# Patient Record
Sex: MALE | Race: BLACK OR AFRICAN AMERICAN | NOT HISPANIC OR LATINO | URBAN - METROPOLITAN AREA
[De-identification: names, ages, dates, MRNs, and addresses within clinical notes are randomized per-mention and may not be internally consistent; named-entity substitution may affect disease eponyms.]

---

## 2019-05-08 ENCOUNTER — INPATIENT (INPATIENT)
Facility: HOSPITAL | Age: 54
LOS: 2 days | Discharge: HOME | DRG: 69 | End: 2019-05-11
Attending: INTERNAL MEDICINE | Admitting: INTERNAL MEDICINE
Payer: COMMERCIAL

## 2019-05-08 VITALS
HEART RATE: 84 BPM | DIASTOLIC BLOOD PRESSURE: 82 MMHG | SYSTOLIC BLOOD PRESSURE: 131 MMHG | TEMPERATURE: 99 F | OXYGEN SATURATION: 99 % | RESPIRATION RATE: 17 BRPM

## 2019-05-08 DIAGNOSIS — G45.3 AMAUROSIS FUGAX: ICD-10-CM

## 2019-05-08 LAB
APTT BLD: 33.7 SEC — SIGNIFICANT CHANGE UP (ref 27.5–36.3)
BASOPHILS # BLD AUTO: 0.1 K/UL — SIGNIFICANT CHANGE UP (ref 0–0.2)
BASOPHILS NFR BLD AUTO: 1.1 % — SIGNIFICANT CHANGE UP (ref 0–2)
EOSINOPHIL # BLD AUTO: 0.5 K/UL — SIGNIFICANT CHANGE UP (ref 0–0.5)
EOSINOPHIL NFR BLD AUTO: 8.6 % — HIGH (ref 0–6)
ERYTHROCYTE [SEDIMENTATION RATE] IN BLOOD: 2 MM/HR — SIGNIFICANT CHANGE UP (ref 0–20)
HCT VFR BLD CALC: 41.1 % — SIGNIFICANT CHANGE UP (ref 39–50)
HGB BLD-MCNC: 14.4 G/DL — SIGNIFICANT CHANGE UP (ref 13–17)
INR BLD: 1.63 RATIO — HIGH (ref 0.88–1.16)
LYMPHOCYTES # BLD AUTO: 1.9 K/UL — SIGNIFICANT CHANGE UP (ref 1–3.3)
LYMPHOCYTES # BLD AUTO: 34.1 % — SIGNIFICANT CHANGE UP (ref 13–44)
MCHC RBC-ENTMCNC: 31.4 PG — SIGNIFICANT CHANGE UP (ref 27–34)
MCHC RBC-ENTMCNC: 34.9 GM/DL — SIGNIFICANT CHANGE UP (ref 32–36)
MCV RBC AUTO: 89.9 FL — SIGNIFICANT CHANGE UP (ref 80–100)
MONOCYTES # BLD AUTO: 0.5 K/UL — SIGNIFICANT CHANGE UP (ref 0–0.9)
MONOCYTES NFR BLD AUTO: 8.2 % — SIGNIFICANT CHANGE UP (ref 2–14)
NEUTROPHILS # BLD AUTO: 2.7 K/UL — SIGNIFICANT CHANGE UP (ref 1.8–7.4)
NEUTROPHILS NFR BLD AUTO: 48 % — SIGNIFICANT CHANGE UP (ref 43–77)
PLATELET # BLD AUTO: 202 K/UL — SIGNIFICANT CHANGE UP (ref 150–400)
PROTHROM AB SERPL-ACNC: 19 SEC — HIGH (ref 10–12.9)
RBC # BLD: 4.57 M/UL — SIGNIFICANT CHANGE UP (ref 4.2–5.8)
RBC # FLD: 11.6 % — SIGNIFICANT CHANGE UP (ref 10.3–14.5)
WBC # BLD: 5.6 K/UL — SIGNIFICANT CHANGE UP (ref 3.8–10.5)
WBC # FLD AUTO: 5.6 K/UL — SIGNIFICANT CHANGE UP (ref 3.8–10.5)

## 2019-05-08 PROCEDURE — 99285 EMERGENCY DEPT VISIT HI MDM: CPT | Mod: 25

## 2019-05-08 PROCEDURE — 70450 CT HEAD/BRAIN W/O DYE: CPT | Mod: 26

## 2019-05-08 PROCEDURE — 71250 CT THORAX DX C-: CPT | Mod: 26

## 2019-05-08 PROCEDURE — 93010 ELECTROCARDIOGRAM REPORT: CPT | Mod: NC

## 2019-05-08 RX ORDER — METOPROLOL TARTRATE 50 MG
25 TABLET ORAL DAILY
Qty: 0 | Refills: 0 | Status: DISCONTINUED | OUTPATIENT
Start: 2019-05-08 | End: 2019-05-11

## 2019-05-08 RX ORDER — WARFARIN SODIUM 2.5 MG/1
8 TABLET ORAL ONCE
Qty: 0 | Refills: 0 | Status: COMPLETED | OUTPATIENT
Start: 2019-05-08 | End: 2019-05-08

## 2019-05-08 RX ORDER — HEPARIN SODIUM 5000 [USP'U]/ML
INJECTION INTRAVENOUS; SUBCUTANEOUS
Qty: 25000 | Refills: 0 | Status: DISCONTINUED | OUTPATIENT
Start: 2019-05-08 | End: 2019-05-11

## 2019-05-08 RX ORDER — ASPIRIN/CALCIUM CARB/MAGNESIUM 324 MG
81 TABLET ORAL DAILY
Qty: 0 | Refills: 0 | Status: DISCONTINUED | OUTPATIENT
Start: 2019-05-08 | End: 2019-05-11

## 2019-05-08 RX ADMIN — HEPARIN SODIUM 1200 UNIT(S)/HR: 5000 INJECTION INTRAVENOUS; SUBCUTANEOUS at 22:14

## 2019-05-08 RX ADMIN — WARFARIN SODIUM 8 MILLIGRAM(S): 2.5 TABLET ORAL at 22:24

## 2019-05-08 NOTE — H&P ADULT - ASSESSMENT
pt with hx of mechanical MVR sec TO mR, PAF non compliant to AC with visual complain, ha, and palpitation.  admit to tele  optho  ct scan  of head r/o cva  will consider MRI  ct chest sec to mass  beta blocker  coumadin  asa daily

## 2019-05-08 NOTE — CONSULT NOTE ADULT - SUBJECTIVE AND OBJECTIVE BOX
[facility] Ophthalmology Consult Note    HPI:  54 yo PMHx of mitral valve replacement on Coumadin, non-smoker, presents to the ED referred by his cardiologist c/o 3 episodes of transient vision loss in right eye over last few months. He describes it as "opaque blurring of lower half of his vision" that lasts under a minute and resolves with an episode occurring today. Denies HA, scalp tenderness, new joint pain, jaw pain, pain with chewing, hip/shoulder pain, flashers, floaters. Vision currently at baseline.      PMH: As above  Meds: Coumadin, see sunrise  POcHx (including surgeries/lasers/trauma):  PTG surgery OU unsure when  Drops: None  FamHx: None  Social Hx: Nonsmoker  Allergies: NKDA    ROS:  General (neg), Vision (per HPI), Head and Neck (neg), Pulm (neg), CV (neg), GI (neg),  (neg), Musculoskeletal (neg), Skin/Integ (neg), Neuro (neg), Endocrine (neg), Heme (neg), All/Immuno (neg)    Mood and Affect Appropriate ( x ),  Oriented to Time, Place, and Person x 3 ( x )    Ophthalmology Exam    Visual acuity (cc): 20/40 PH 20/20 OD, 20/30 PH 20/20 OS  Pupils: PERRL OU, no APD  Ttono: 13 OU  Extraocular movements (EOMs): Full OU, no pain, no diplopia   Confrontational Visual Field (CVF):  Full OU  Color Plates: 12/12 OU    Pen Light Exam (PLE)  External:  Flat OU  Lids/Lashes/Lacrimal Ducts: Flat OU    Sclera/Conjunctiva:  W+Q OU  Cornea: Cl OU  Anterior Chamber: D+F OU  Iris:  Flat OU  Lens:  Cl OU    Fundus Exam: dilated with 1% tropicamide and 2.5% phenylephrine  Approval obtained from primary team for dilation  Patient aware that pupils can remained dilated for at least 4-6 hours  Exam performed with 20D lens    Vitreous: wnl OU  Disc, cup/disc: sharp and pink, 0.4 OU  Macula:  wnl OU  Vessels:  wnl OU  Periphery: CRS scarring temp and inf OD, wnl OS    Assessment/Plan:   54 yo PMHx of mitral valve replacement on Coumadin, non-smoker, presents to the ED referred by his cardiologist c/o 3 episodes of transient vision loss in right eye over last few months. He describes it as "opaque blurring of lower half of his vision" that lasts under a minute and resolves with an episode occurring today. Denies HA, scalp tenderness, new joint pain, jaw pain, pain with chewing, hip/shoulder pain, flashers, floaters. Vision currently at baseline.  - low suspicion for GCA based on symptoms, ED sent ESR, CRP. Ply wnl   - dilated fundus exam within normal limits in both eyes  - recommend Neurology consultation for TIA work-up/imaging, with Cardiovascular work-up, Carotid ultrasound  - Patient should follow up his/her ophthalmologist/neuro-opth or in the [facility] within 1 week of discharge.  [address] [facility].  [place], [place] [id number] [phone]

## 2019-05-08 NOTE — ED ADULT NURSE NOTE - OBJECTIVE STATEMENT
52 yo presents to the ED from home. A&Ox4, ambulatory c/o intermittent R eye vision loss x 1 week. PMH of mitral valve replacement, on AC. no HA. no pain anywhere. gross neuro intact. vision intact currently. VSS. pt well appearing. 20G LAC.

## 2019-05-08 NOTE — ED PROVIDER NOTE - CRANIAL NERVE AND PUPILLARY EXAM
cranial nerves 2-12 intact/central and peripheral vision intact/extra-ocular movements intact/cough reflex intact/corneal reflex intact

## 2019-05-08 NOTE — ED PROVIDER NOTE - CLINICAL SUMMARY MEDICAL DECISION MAKING FREE TEXT BOX
Pt with a mechanical mitral valve has 2 episodes of altered vision transiently back to base line now non focal exam on coumadin concern for emboli to right eye CT head eval for temporal arteritis labs eye eval admit --Lacey

## 2019-05-08 NOTE — H&P ADULT - HISTORY OF PRESENT ILLNESS
CHIEF COMPLAINT:Patient is a 53y old  Male who presents with a chief complaint of r sided visual loss/palpitation    HPI:  pt is a 52 yo male with hx of mitral regurgitation s/p mechanical valve, non compliant to follow up who was sent to er with decrease r eye vision last few days, hd and palpitation.  pt with known  hx of paf .  no chest pain.    PAST MEDICAL & SURGICAL HISTORY:  Mitral Valve Disease  Abnormality, Eye: ptygerium removed  s/p Mitral Valve Repair: 8/21/09      MEDICATIONS  (STANDING):    MEDICATIONS  (PRN):  coumadin    FAMILY HISTORY:      SOCIAL HISTORY:    [ ] Non-smoker  [ ] Smoker  [ ] Alcohol    Allergies    No Known Allergies    Intolerances    	    REVIEW OF SYSTEMS:  CONSTITUTIONAL: No fever, weight loss, or fatigue  EYES: No eye pain,+ r eye  visual disturbances, or discharge  ENT:  No difficulty hearing, tinnitus, vertigo; No sinus or throat pain  NECK: No pain or stiffness  RESPIRATORY: No cough, wheezing, chills or hemoptysis; + Shortness of Breath  CARDIOVASCULAR: No chest pain, palpitations, passing out, dizziness, or leg swelling  GASTROINTESTINAL: No abdominal or epigastric pain. No nausea, vomiting, or hematemesis; No diarrhea or constipation. No melena or hematochezia.  GENITOURINARY: No dysuria, frequency, hematuria, or incontinence  NEUROLOGICAL: No headaches, memory loss, loss of strength, numbness, or tremors  SKIN: No itching, burning, rashes, or lesions   LYMPH Nodes: No enlarged glands  ENDOCRINE: No heat or cold intolerance; No hair loss  MUSCULOSKELETAL: No joint pain or swelling; No muscle, back, or extremity pain  PSYCHIATRIC: No depression, anxiety, mood swings, or difficulty sleeping  HEME/LYMPH: No easy bruising, or bleeding gums  ALLERGY AND IMMUNOLOGIC: No hives or eczema	    [ ] All others negative	  [ ] Unable to obtain    PHYSICAL EXAM:  T(C): 37 (05-08-19 @ 16:40), Max: 37 (05-08-19 @ 16:40)  HR: 84 (05-08-19 @ 16:40) (84 - 84)  BP: 131/82 (05-08-19 @ 16:40) (131/82 - 131/82)  RR: 17 (05-08-19 @ 16:40) (17 - 17)  SpO2: 99% (05-08-19 @ 16:40) (99% - 99%)  Wt(kg): --  I&O's Summary      Appearance: Normal	  HEENT:   Normal oral mucosa, PERRL, EOMI	  Lymphatic: No lymphadenopathy  Cardiovascular: Normal S1 S2, No JVD, + murmurs, No edema  Respiratory: Lungs clear to auscultation	  Psychiatry: A & O x 3, Mood & affect appropriate  Gastrointestinal:  Soft, Non-tender, + BS	  Skin: No rashes, No ecchymoses, No cyanosis	  Neurologic: Non-focal  Extremities: Normal range of motion, No clubbing, cyanosis or edema  Vascular: Peripheral pulses palpable 2+ bilaterally  + small mass under the r breast, non tender.    TELEMETRY: 	    ECG:  	  RADIOLOGY:  OTHER: 	  	  LABS:	 	    CARDIAC MARKERS:                              14.4   5.6   )-----------( 202      ( 08 May 2019 18:03 )             41.1           proBNP:   Lipid Profile:   HgA1c:   TSH:   PT/INR - ( 08 May 2019 18:03 )   PT: 19.0 sec;   INR: 1.63 ratio         PTT - ( 08 May 2019 18:03 )  PTT:33.7 sec    PREVIOUS DIAGNOSTIC TESTING:    < from: Transthoracic Echocardiogram w/ Doppler (05.13.10 @ 08:49) >  1. Mechanical mitral valve prosthesis.  2. Severe left atrial enlargement.  3. Normal left ventricular internal dimensions and wall  thickness.  4. Normal left ventricular systolic function. EF 60%. No  Segmental wall motion abnormalities  5. Normal right atrium.  6. Normal right ventricular size and systolic function.  7. Normal pericardium with no pericardial effusion.  8. Mild mitral regurgitation. Mean transmitral valve  gradient equals 5mm Hg, which is probably normal in the  setting of a prosthetic valve.  9. Mild tricuspid regurgitation. Estimated pulmonary artery  systolic pressure equals 31 mm Hg, assuming right atrial  pressure equals 10  mm Hg.    < end of copied text >

## 2019-05-08 NOTE — ED ADULT NURSE REASSESSMENT NOTE - NS ED NURSE REASSESS COMMENT FT1
Pt resting comfortably in bed. Heparin drip infusing. Pt denying discomfort or vision changes at this time. Provided call bell and extra blankets. Waiting for bed upstairs. Pt resting comfortably in bed. Heparin drip infusing. Per MD Lacey, no heparin bolus to be given due to warfarin use. Pt denying discomfort or vision changes at this time. Provided call bell and extra blankets. Waiting for bed upstairs.

## 2019-05-09 ENCOUNTER — TRANSCRIPTION ENCOUNTER (OUTPATIENT)
Age: 54
End: 2019-05-09

## 2019-05-09 LAB
ALBUMIN SERPL ELPH-MCNC: 3.9 G/DL — SIGNIFICANT CHANGE UP (ref 3.3–5)
ALP SERPL-CCNC: 47 U/L — SIGNIFICANT CHANGE UP (ref 40–120)
ALT FLD-CCNC: 24 U/L — SIGNIFICANT CHANGE UP (ref 10–45)
ANION GAP SERPL CALC-SCNC: 11 MMOL/L — SIGNIFICANT CHANGE UP (ref 5–17)
APTT BLD: 125.3 SEC — CRITICAL HIGH (ref 27.5–36.3)
APTT BLD: 48.2 SEC — HIGH (ref 27.5–36.3)
APTT BLD: 74.7 SEC — HIGH (ref 27.5–36.3)
AST SERPL-CCNC: 22 U/L — SIGNIFICANT CHANGE UP (ref 10–40)
BILIRUB SERPL-MCNC: 1.6 MG/DL — HIGH (ref 0.2–1.2)
BUN SERPL-MCNC: 8 MG/DL — SIGNIFICANT CHANGE UP (ref 7–23)
CALCIUM SERPL-MCNC: 9 MG/DL — SIGNIFICANT CHANGE UP (ref 8.4–10.5)
CHLORIDE SERPL-SCNC: 104 MMOL/L — SIGNIFICANT CHANGE UP (ref 96–108)
CO2 SERPL-SCNC: 27 MMOL/L — SIGNIFICANT CHANGE UP (ref 22–31)
CREAT SERPL-MCNC: 1.01 MG/DL — SIGNIFICANT CHANGE UP (ref 0.5–1.3)
CRP SERPL-MCNC: <0.1 MG/DL — SIGNIFICANT CHANGE UP (ref 0–0.4)
GLUCOSE SERPL-MCNC: 103 MG/DL — HIGH (ref 70–99)
HCT VFR BLD CALC: 41.3 % — SIGNIFICANT CHANGE UP (ref 39–50)
HCT VFR BLD CALC: 42.9 % — SIGNIFICANT CHANGE UP (ref 39–50)
HCV AB S/CO SERPL IA: 0.19 S/CO — SIGNIFICANT CHANGE UP (ref 0–0.99)
HCV AB SERPL-IMP: SIGNIFICANT CHANGE UP
HGB BLD-MCNC: 14.8 G/DL — SIGNIFICANT CHANGE UP (ref 13–17)
HGB BLD-MCNC: 14.9 G/DL — SIGNIFICANT CHANGE UP (ref 13–17)
INR BLD: 1.99 RATIO — HIGH (ref 0.88–1.16)
MCHC RBC-ENTMCNC: 31 PG — SIGNIFICANT CHANGE UP (ref 27–34)
MCHC RBC-ENTMCNC: 32 PG — SIGNIFICANT CHANGE UP (ref 27–34)
MCHC RBC-ENTMCNC: 34.6 GM/DL — SIGNIFICANT CHANGE UP (ref 32–36)
MCHC RBC-ENTMCNC: 35.7 GM/DL — SIGNIFICANT CHANGE UP (ref 32–36)
MCV RBC AUTO: 89.6 FL — SIGNIFICANT CHANGE UP (ref 80–100)
MCV RBC AUTO: 89.6 FL — SIGNIFICANT CHANGE UP (ref 80–100)
PLATELET # BLD AUTO: 183 K/UL — SIGNIFICANT CHANGE UP (ref 150–400)
PLATELET # BLD AUTO: 187 K/UL — SIGNIFICANT CHANGE UP (ref 150–400)
POTASSIUM SERPL-MCNC: 3.5 MMOL/L — SIGNIFICANT CHANGE UP (ref 3.5–5.3)
POTASSIUM SERPL-SCNC: 3.5 MMOL/L — SIGNIFICANT CHANGE UP (ref 3.5–5.3)
PROT SERPL-MCNC: 6.4 G/DL — SIGNIFICANT CHANGE UP (ref 6–8.3)
PROTHROM AB SERPL-ACNC: 23.4 SEC — HIGH (ref 10–12.9)
RBC # BLD: 4.61 M/UL — SIGNIFICANT CHANGE UP (ref 4.2–5.8)
RBC # BLD: 4.79 M/UL — SIGNIFICANT CHANGE UP (ref 4.2–5.8)
RBC # FLD: 11.4 % — SIGNIFICANT CHANGE UP (ref 10.3–14.5)
RBC # FLD: 11.5 % — SIGNIFICANT CHANGE UP (ref 10.3–14.5)
SODIUM SERPL-SCNC: 142 MMOL/L — SIGNIFICANT CHANGE UP (ref 135–145)
TSH SERPL-MCNC: 1.89 UIU/ML — SIGNIFICANT CHANGE UP (ref 0.27–4.2)
WBC # BLD: 5.1 K/UL — SIGNIFICANT CHANGE UP (ref 3.8–10.5)
WBC # BLD: 5.2 K/UL — SIGNIFICANT CHANGE UP (ref 3.8–10.5)
WBC # FLD AUTO: 5.1 K/UL — SIGNIFICANT CHANGE UP (ref 3.8–10.5)
WBC # FLD AUTO: 5.2 K/UL — SIGNIFICANT CHANGE UP (ref 3.8–10.5)

## 2019-05-09 PROCEDURE — 93306 TTE W/DOPPLER COMPLETE: CPT | Mod: 26

## 2019-05-09 PROCEDURE — 70551 MRI BRAIN STEM W/O DYE: CPT | Mod: 26

## 2019-05-09 PROCEDURE — 71045 X-RAY EXAM CHEST 1 VIEW: CPT | Mod: 26

## 2019-05-09 PROCEDURE — 93880 EXTRACRANIAL BILAT STUDY: CPT | Mod: 26

## 2019-05-09 PROCEDURE — 99223 1ST HOSP IP/OBS HIGH 75: CPT

## 2019-05-09 RX ORDER — WARFARIN SODIUM 2.5 MG/1
6 TABLET ORAL ONCE
Refills: 0 | Status: COMPLETED | OUTPATIENT
Start: 2019-05-09 | End: 2019-05-09

## 2019-05-09 RX ORDER — WARFARIN SODIUM 2.5 MG/1
2 TABLET ORAL ONCE
Refills: 0 | Status: COMPLETED | OUTPATIENT
Start: 2019-05-09 | End: 2019-05-09

## 2019-05-09 RX ADMIN — HEPARIN SODIUM 600 UNIT(S)/HR: 5000 INJECTION INTRAVENOUS; SUBCUTANEOUS at 16:33

## 2019-05-09 RX ADMIN — WARFARIN SODIUM 6 MILLIGRAM(S): 2.5 TABLET ORAL at 21:44

## 2019-05-09 RX ADMIN — HEPARIN SODIUM 600 UNIT(S)/HR: 5000 INJECTION INTRAVENOUS; SUBCUTANEOUS at 23:37

## 2019-05-09 RX ADMIN — HEPARIN SODIUM 0 UNIT(S)/HR: 5000 INJECTION INTRAVENOUS; SUBCUTANEOUS at 05:01

## 2019-05-09 RX ADMIN — WARFARIN SODIUM 2 MILLIGRAM(S): 2.5 TABLET ORAL at 23:37

## 2019-05-09 RX ADMIN — HEPARIN SODIUM 0 UNIT(S)/HR: 5000 INJECTION INTRAVENOUS; SUBCUTANEOUS at 13:19

## 2019-05-09 RX ADMIN — Medication 81 MILLIGRAM(S): at 13:21

## 2019-05-09 RX ADMIN — HEPARIN SODIUM 900 UNIT(S)/HR: 5000 INJECTION INTRAVENOUS; SUBCUTANEOUS at 06:04

## 2019-05-09 RX ADMIN — Medication 25 MILLIGRAM(S): at 05:30

## 2019-05-09 NOTE — DISCHARGE NOTE PROVIDER - CARE PROVIDERS DIRECT ADDRESSES
,DirectAddress_Unknown ,DirectAddress_Unknown,[email or web address] ,DirectAddress_Unknown,[email or web address],DirectAddress_Unknown

## 2019-05-09 NOTE — PROGRESS NOTE ADULT - SUBJECTIVE AND OBJECTIVE BOX
CARDIOLOGY     PROGRESS  NOTE   ________________________________________________    CHIEF COMPLAINT:Patient is a 53y old  Male who presents with a chief complaint of blurry vision r side/palpitation (08 May 2019 20:22)    	  REVIEW OF SYSTEMS:  CONSTITUTIONAL: No fever, weight loss, or fatigue  EYES: No eye pain, visual disturbances, or discharge  ENT:  No difficulty hearing, tinnitus, vertigo; No sinus or throat pain  NECK: No pain or stiffness  RESPIRATORY: No cough, wheezing, chills or hemoptysis; No Shortness of Breath  CARDIOVASCULAR: No chest pain, palpitations, passing out, dizziness, or leg swelling  GASTROINTESTINAL: No abdominal or epigastric pain. No nausea, vomiting, or hematemesis; No diarrhea or constipation. No melena or hematochezia.  GENITOURINARY: No dysuria, frequency, hematuria, or incontinence  NEUROLOGICAL: No headaches, memory loss, loss of strength, numbness, or tremors  SKIN: No itching, burning, rashes, or lesions   LYMPH Nodes: No enlarged glands  ENDOCRINE: No heat or cold intolerance; No hair loss  MUSCULOSKELETAL: No joint pain or swelling; No muscle, back, or extremity pain  PSYCHIATRIC: No depression, anxiety, mood swings, or difficulty sleeping  HEME/LYMPH: No easy bruising, or bleeding gums  ALLERGY AND IMMUNOLOGIC: No hives or eczema	    [ ] All others negative	  [ ] Unable to obtain    PHYSICAL EXAM:  T(C): 36.4 (05-09-19 @ 05:16), Max: 37 (05-08-19 @ 16:40)  HR: 64 (05-09-19 @ 05:16) (64 - 84)  BP: 131/76 (05-09-19 @ 05:16) (131/76 - 138/86)  RR: 18 (05-09-19 @ 05:16) (17 - 18)  SpO2: 99% (05-09-19 @ 05:16) (99% - 99%)  Wt(kg): --  I&O's Summary      Appearance: Normal	  HEENT:   Normal oral mucosa, PERRL, EOMI	  Lymphatic: No lymphadenopathy  Cardiovascular: Normal S1 S2, No JVD, No murmurs, No edema  Respiratory: Lungs clear to auscultation	  Psychiatry: A & O x 3, Mood & affect appropriate  Gastrointestinal:  Soft, Non-tender, + BS	  Skin: No rashes, No ecchymoses, No cyanosis	  Neurologic: Non-focal  Extremities: Normal range of motion, No clubbing, cyanosis or edema  Vascular: Peripheral pulses palpable 2+ bilaterally    MEDICATIONS  (STANDING):  aspirin enteric coated 81 milliGRAM(s) Oral daily  heparin  Infusion.  Unit(s)/Hr (12 mL/Hr) IV Continuous <Continuous>  metoprolol succinate ER 25 milliGRAM(s) Oral daily      TELEMETRY: 	    ECG:  	  RADIOLOGY:  OTHER: 	  	  LABS:	 	    CARDIAC MARKERS:                                14.9   5.1   )-----------( 187      ( 09 May 2019 04:30 )             42.9     05-09    142  |  104  |  8   ----------------------------<  103<H>  3.5   |  27  |  1.01    Ca    9.0      09 May 2019 04:30    TPro  6.4  /  Alb  3.9  /  TBili  1.6<H>  /  DBili  x   /  AST  22  /  ALT  24  /  AlkPhos  47  05-09    proBNP:   Lipid Profile:   HgA1c:   TSH:   PT/INR - ( 09 May 2019 04:30 )   PT: 23.4 sec;   INR: 1.99 ratio         PTT - ( 09 May 2019 04:30 )  PTT:125.3 sec    < from: CT Head w/o Cont (01.03.13 @ 19:49) >  IMPRESSION:  No acute intracranial hemorrhage, mass effect, or CT   evidence of acute territorial infarct.    < end of copied text >  < from: CT Head w/o Cont (01.03.13 @ 19:49) >  IMPRESSION:  No acute intracranial hemorrhage, mass effect, or CT   evidence of acute territorial infarct.        Assessment and plan  ---------------------------  awaiting mri  continue ac  surgery called for the mass  continue beta blocker  echo

## 2019-05-09 NOTE — CONSULT NOTE ADULT - ASSESSMENT
pt with hx of mechanical MVR  for  mR,    PAF non compliant to AC with visual complain,ts,  and palpitation.  admit to tele  optho eval/  vision good  now  ct scan  of head r/o cva  lipoma,  right chest  wall  beta blocker  coumadin. per inr  asa daily   ct head, normal

## 2019-05-09 NOTE — DISCHARGE NOTE PROVIDER - CARE PROVIDER_API CALL
Wendy Raza (DO)  Cardiology Medicine  [address] [place] [address], [address]  [place] [address]  Phone: [phone]  Fax: [phone]  Follow Up Time: 1-3 days Wendy Raza (DO)  Cardiology Medicine  [address] [place] [address], [address]  [place] [address]  Phone: [phone]  Fax: [phone]  Follow Up Time: 1-3 days    Osmin Osorio)  Surgery  [address] [facility], [place] [address]  Phone: [phone]  Fax: [phone]  Follow Up Time: 2 weeks Wendy Raza (DO)  Cardiology Medicine  [address] [place] [address], [address]  [place] [address]  Phone: [phone]  Fax: [phone]  Follow Up Time: 1-3 days    Osmin Osorio)  Surgery  [address] [facility], [place] [address]  Phone: [phone]  Fax: [phone]  Follow Up Time: 2 weeks    Andre Baron (DO)  Neurology; Vascular Neurology  [address] [facility] [address]  [place] [address]  Phone: [phone]  Fax: [phone]  Follow Up Time: 2 weeks

## 2019-05-09 NOTE — CONSULT NOTE ADULT - ATTENDING COMMENTS
- I have seen and examined the patient on rounds. Patient's chart, labs, images and reports reviewed.  Consult called to evaluate Right chest wall mass  Pt claims to have self palpated the mass measuring 2 cm in R chest wall for a few weeks  no pain  On exam- 2 cm mobile soft mass right lateral outer chest wall , consistent with most likely a lipoma  No need for urgent surgical intervention  Can plan elective excision.  -Patient instructed to follow up with me in my office 7-10 days after discharge. Follow up instructions provided.  - I have discussed the diagnosis with the patient in detail. Patient expressed verbal understanding and willingness to proceed with proposed plan. All questions answered    Regards  Osmin Mac MD  Division of Surgical Oncology  [address] [place] [id number]  Ph:[phone]

## 2019-05-09 NOTE — CONSULT NOTE ADULT - SUBJECTIVE AND OBJECTIVE BOX
NUTRITION SUPPORT / TPN CONSULT NOTE      CHIEF COMPLAINT:Patient is a 53y old  Male who presents with a chief complaint of r sided visual loss/palpitation    HPI:  Pt is a 54 yo male with hx of mitral regurgitation s/p mechanical valve, non compliant to follow up who was sent to er with decrease r eye vision last few days, hd and palpitation. Pt with known  hx of PAF . No chest pain. Right breast mass noted on physical exam    PAST MEDICAL & SURGICAL HISTORY:  Mitral Valve Disease  Abnormality, Eye: ptygerium removed  s/p Mitral Valve Repair: 8/21/09      MEDICATIONS  (STANDING):    MEDICATIONS  coumadin    FAMILY HISTORY:      SOCIAL HISTORY:    [ ] Non-smoker  [ ] Smoker  [ ] Alcohol    Allergies    No Known Allergies    Intolerances    	    REVIEW OF SYSTEMS:  CONSTITUTIONAL: No fever, weight loss, or fatigue  EYES: as per HPI  ENT:  No difficulty hearing, tinnitus, vertigo; No sinus or throat pain  NECK: No pain or stiffness  RESPIRATORY: No cough, wheezing, chills or hemoptysis; + Shortness of Breath  CARDIOVASCULAR: No chest pain, palpitations, passing out, dizziness, or leg swelling  GASTROINTESTINAL: No abdominal or epigastric pain. No nausea, vomiting, or hematemesis; No diarrhea or constipation. No melena or hematochezia.  GENITOURINARY: No dysuria, frequency, hematuria, or incontinence  NEUROLOGICAL: No headaches, memory loss, loss of strength, numbness, or tremors  SKIN: No itching, burning, rashes, or lesions   LYMPH Nodes: No enlarged glands  ENDOCRINE: No heat or cold intolerance; No hair loss  MUSCULOSKELETAL: No joint pain or swelling; No muscle, back, or extremity pain  PSYCHIATRIC: No depression, anxiety, mood swings, or difficulty sleeping  HEME/LYMPH: No easy bruising, or bleeding gums  ALLERGY AND IMMUNOLOGIC: No hives or eczema	    [ ] All others negative	  [ ] Unable to obtain    PHYSICAL EXAM:  T(C): 37 (05-08-19 @ 16:40), Max: 37 (05-08-19 @ 16:40)  HR: 84 (05-08-19 @ 16:40) (84 - 84)  BP: 131/82 (05-08-19 @ 16:40) (131/82 - 131/82)  RR: 17 (05-08-19 @ 16:40) (17 - 17)  SpO2: 99% (05-08-19 @ 16:40) (99% - 99%)  Wt(kg): --  I&O's Summary      Appearance: Normal	  HEENT:   Normal oral mucosa, PERRL, EOMI	  Lymphatic: No lymphadenopathy  Cardiovascular: Normal S1 S2, No JVD, + murmurs, No edema  Respiratory: Lungs clear to auscultation	  Psychiatry: A & O x 3, Mood & affect appropriate  Gastrointestinal:  Soft, Non-tender, + BS	  Skin: No rashes, No ecchymoses, No cyanosis	  Neurologic: Non-focal  Extremities: Normal range of motion, No clubbing, cyanosis or edema  Vascular: Peripheral pulses palpable 2+ bilaterally  + small mass under the r breast, non tender.    TELEMETRY: 	    ECG:  	  RADIOLOGY:  OTHER: 	  	  LABS:	 	    CARDIAC MARKERS:                              14.4   5.6   )-----------( 202      ( 08 May 2019 18:03 )             41.1           PT/INR - ( 08 May 2019 18:03 )   PT: 19.0 sec;   INR: 1.63 ratio         PTT - ( 08 May 2019 18:03 )  PTT:33.7 sec    PREVIOUS DIAGNOSTIC TESTING:    < from: Transthoracic Echocardiogram w/ Doppler (05.13.10 @ 08:49) >  1. Mechanical mitral valve prosthesis.  2. Severe left atrial enlargement.  3. Normal left ventricular internal dimensions and wall  thickness.  4. Normal left ventricular systolic function. EF 60%. No  Segmental wall motion abnormalities  5. Normal right atrium.  6. Normal right ventricular size and systolic function.  7. Normal pericardium with no pericardial effusion.  8. Mild mitral regurgitation. Mean transmitral valve  gradient equals 5mm Hg, which is probably normal in the  setting of a prosthetic valve.  9. Mild tricuspid regurgitation. Estimated pulmonary artery  systolic pressure equals 31 mm Hg, assuming right atrial  pressure equals 10  mm Hg.    < end of copied text > (08 May 2019 20:22)            MEDICATIONS  (STANDING):  aspirin enteric coated 81 milliGRAM(s) Oral daily  heparin  Infusion.  Unit(s)/Hr (12 mL/Hr) IV Continuous <Continuous>  metoprolol succinate ER 25 milliGRAM(s) Oral daily    MEDICATIONS  (PRN):      PAST MEDICAL & SURGICAL HISTORY:  Mitral Valve Disease  Abnormality, Eye: ptygerium removed  s/p Mitral Valve Repair: 8/21/09      FAMILY HISTORY:      ALLERGIES  No Known Allergies      REVIEW OF SYSTEMS  --------------------------------------------------------------------------------    Skin: No rashes  Head/Eyes/Ears/Mouth: No headache; Normal hearing; Normal vision w/o blurriness; No sinus pain/discomfort, sore throat  Respiratory: No dyspnea, cough, wheezing, hemoptysis  CV: No chest pain, PND, orthopnea  GI: No abdominal pain, diarrhea, constipation, nausea, vomiting, melena, hematochezia  : No increased frequency, dysuria, hematuria, nocturia  MSK: No joint pain/swelling; no back pain; no edema  Neuro: No dizziness/lightheadedness, weakness, seizures, numbness, tingling  Heme: No easy bruising or bleeding  Endo: No heat/cold intolerance  Psych: No significant nervousness, anxiety, stress, depression      VITALS  T(C): 36.4 (05-09-19 @ 05:16), Max: 37 (05-08-19 @ 16:40)  HR: 64 (05-09-19 @ 05:16) (64 - 84)  BP: 131/76 (05-09-19 @ 05:16) (131/76 - 138/86)  RR: 18 (05-09-19 @ 05:16) (17 - 18)  SpO2: 99% (05-09-19 @ 05:16) (99% - 99%)  I&O's Detail        LABS:                        14.9   5.1   )-----------( 187      ( 09 May 2019 04:30 )             42.9     05-09    142  |  104  |  8   ----------------------------<  103<H>  3.5   |  27  |  1.01    Ca    9.0      09 May 2019 04:30    TPro  6.4  /  Alb  3.9  /  TBili  1.6<H>  /  DBili  x   /  AST  22  /  ALT  24  /  AlkPhos  47  05-09    Ionized Calcium Levels:     CAPILLARY BLOOD GLUCOSE      CAPILLARY BLOOD GLUCOSE        PT/INR - ( 09 May 2019 04:30 )   PT: 23.4 sec;   INR: 1.99 ratio         PTT - ( 09 May 2019 04:30 )  PTT:125.3 sec        PHYSICAL EXAM  --------------------------------------------------------------------------------    Physical Exam:  	Gen: NAD, well-appearing  	HEENT: PERRL, supple neck, clear oropharynx  	Pulm: CTA B/L  	CV: RRR, S1S2; no rub  	Back: No spinal or CVA tenderness; no sacral edema  	Abd: +BS, soft, nontender/nondistended          UE: Warm, FROM, no clubbing, intact strength; no edema; no asterixis  	LE: Warm, FROM, no clubbing, intact strength; no edema  	Neuro: No focal deficits, intact gait  	Psych: Normal affect and mood  	Skin: Warm, without rashes NUTRITION SUPPORT / TPN CONSULT NOTE      CHIEF COMPLAINT:Patient is a 53y old  Male who presents with a chief complaint of r sided visual loss/palpitation    HPI:  Pt is a 54 yo male with hx of mitral regurgitation s/p mechanical valve, non compliant to follow up who was sent to er with decrease r eye vision last few days, hd and palpitation. Pt with known  hx of PAF .    Surgery consult called for Right breast mass noted on physical exam.  Pt reports mass is non tender and noticed last fall, and has not grown in size. Aneesh any other palpable masses.  Denies F/C/NS. Denies any assocaited pain.  He reports his visual changes has since resolved.  CT of head was negative.  MRI scheduled for later today.    PAST MEDICAL & SURGICAL HISTORY:  Mitral Valve Disease  Abnormality, Eye: ptygerium removed  s/p Mitral Valve Repair: 8/21/09      MEDICATIONS  coumadin 7mg qD    FAMILY HISTORY:  denies any fam h/o DM, Asthma, HTN  reports Heart disease      SOCIAL HISTORY:    Non-smoker, Social Alcohol use    Allergies  No Known Allergies    Intolerances  none    	    REVIEW OF SYSTEMS:  CONSTITUTIONAL: No fever, weight loss, or fatigue  EYES: as per HPI  ENT:  No difficulty hearing, tinnitus, vertigo; No sinus or throat pain  NECK: No pain or stiffness  RESPIRATORY: No cough, wheezing, chills or hemoptysis; denies Shortness of Breath  CARDIOVASCULAR: No chest pain, palpitations, passing out, dizziness, or leg swelling  GASTROINTESTINAL: No abdominal or epigastric pain. No nausea, vomiting, or hematemesis; No diarrhea or constipation. No melena or hematochezia.  GENITOURINARY: No dysuria, frequency, hematuria, or incontinence  NEUROLOGICAL: No headaches, memory loss, loss of strength, numbness, or tremors  SKIN: No itching, burning, rashes, or lesions   LYMPH Nodes: No enlarged glands  ENDOCRINE: No heat or cold intolerance; No hair loss  MUSCULOSKELETAL: No joint pain or swelling; No muscle, back, or extremity pain  PSYCHIATRIC: No depression, anxiety, mood swings, or difficulty sleeping  HEME/LYMPH:  Reports easy bruising, denies  bleeding gums  ALLERGY AND IMMUNOLOGIC: No hives or eczema	      PHYSICAL EXAM:  T(C): 37 (05-08-19 @ 16:40), Max: 37 (05-08-19 @ 16:40)  HR: 84 (05-08-19 @ 16:40) (84 - 84)  BP: 131/82 (05-08-19 @ 16:40) (131/82 - 131/82)  RR: 17 (05-08-19 @ 16:40) (17 - 17)  SpO2: 99% (05-08-19 @ 16:40) (99% - 99%)  Wt(kg): --  I&O's Summary      Appearance: Normal	  HEENT:   Normal oral mucosa, PERRL, EOMI	  Lymphatic: No axillar  lymphadenopathy  Cardiovascular: Normal S1 S2, No JVD, + murmurs, No edema  Respiratory: Lungs clear to auscultation	Silver dollar size moveable fluctuant mass 23cm away from nipple, at 8:00 right outer lower quadrant, non tender, non erythematous, no skin changes   Psychiatry: A & O x 3, Mood & affect appropriate  Gastrointestinal:  Soft, Non-tender, + BS	  Skin: No rashes, No ecchymoses, No cyanosis	  Neurologic: Non-focal  Extremities: Normal range of motion, No clubbing, cyanosis or edema  Vascular: Peripheral pulses palpable 2+ bilaterally  + small mass under the r breast, non tender.    TELEMETRY: 	    ECG:  	  RADIOLOGY:  OTHER: 	  	  LABS:	 	    CARDIAC MARKERS:                              14.4   5.6   )-----------( 202      ( 08 May 2019 18:03 )             41.1           PT/INR - ( 08 May 2019 18:03 )   PT: 19.0 sec;   INR: 1.63 ratio         PTT - ( 08 May 2019 18:03 )  PTT:33.7 sec    PREVIOUS DIAGNOSTIC TESTING:    < from: Transthoracic Echocardiogram w/ Doppler (05.13.10 @ 08:49) >  1. Mechanical mitral valve prosthesis.  2. Severe left atrial enlargement.  3. Normal left ventricular internal dimensions and wall  thickness.  4. Normal left ventricular systolic function. EF 60%. No  Segmental wall motion abnormalities  5. Normal right atrium.  6. Normal right ventricular size and systolic function.  7. Normal pericardium with no pericardial effusion.  8. Mild mitral regurgitation. Mean transmitral valve  gradient equals 5mm Hg, which is probably normal in the  setting of a prosthetic valve.  9. Mild tricuspid regurgitation. Estimated pulmonary artery  systolic pressure equals 31 mm Hg, assuming right atrial  pressure equals 10  mm Hg.    < end of copied text > (08 May 2019 20:22)            MEDICATIONS  (STANDING):  aspirin enteric coated 81 milliGRAM(s) Oral daily  heparin  Infusion.  Unit(s)/Hr (12 mL/Hr) IV Continuous <Continuous>  metoprolol succinate ER 25 milliGRAM(s) Oral daily    MEDICATIONS  (PRN):      PAST MEDICAL & SURGICAL HISTORY:  Mitral Valve Disease  Abnormality, Eye: ptygerium removed  s/p Mitral Valve Repair: 8/21/09      FAMILY HISTORY:      ALLERGIES  No Known Allergies      REVIEW OF SYSTEMS  --------------------------------------------------------------------------------    Skin: No rashes  Head/Eyes/Ears/Mouth: No headache; Normal hearing; Normal vision w/o blurriness; No sinus pain/discomfort, sore throat  Respiratory: No dyspnea, cough, wheezing, hemoptysis  CV: No chest pain, PND, orthopnea  GI: No abdominal pain, diarrhea, constipation, nausea, vomiting, melena, hematochezia  : No increased frequency, dysuria, hematuria, nocturia  MSK: No joint pain/swelling; no back pain; no edema  Neuro: No dizziness/lightheadedness, weakness, seizures, numbness, tingling  Heme: No easy bruising or bleeding  Endo: No heat/cold intolerance  Psych: No significant nervousness, anxiety, stress, depression      VITALS  T(C): 36.4 (05-09-19 @ 05:16), Max: 37 (05-08-19 @ 16:40)  HR: 64 (05-09-19 @ 05:16) (64 - 84)  BP: 131/76 (05-09-19 @ 05:16) (131/76 - 138/86)  RR: 18 (05-09-19 @ 05:16) (17 - 18)  SpO2: 99% (05-09-19 @ 05:16) (99% - 99%)  I&O's Detail        LABS:                        14.9   5.1   )-----------( 187      ( 09 May 2019 04:30 )             42.9     05-09    142  |  104  |  8   ----------------------------<  103<H>  3.5   |  27  |  1.01    Ca    9.0      09 May 2019 04:30    TPro  6.4  /  Alb  3.9  /  TBili  1.6<H>  /  DBili  x   /  AST  22  /  ALT  24  /  AlkPhos  47  05-09    Ionized Calcium Levels:     CAPILLARY BLOOD GLUCOSE      CAPILLARY BLOOD GLUCOSE        PT/INR - ( 09 May 2019 04:30 )   PT: 23.4 sec;   INR: 1.99 ratio         PTT - ( 09 May 2019 04:30 )  PTT:125.3 sec        PHYSICAL EXAM  --------------------------------------------------------------------------------    Physical Exam:  	Gen: NAD, well-appearing  	HEENT: PERRL, supple neck, clear oropharynx  	Pulm: CTA B/L  	CV: RRR, S1S2; no rub  	Back: No spinal or CVA tenderness; no sacral edema  	Abd: +BS, soft, nontender/nondistended          UE: Warm, FROM, no clubbing, intact strength; no edema; no asterixis  	LE: Warm, FROM, no clubbing, intact strength; no edema  	Neuro: No focal deficits, intact gait  	Psych: Normal affect and mood  	Skin: Warm, without rashes

## 2019-05-09 NOTE — DISCHARGE NOTE PROVIDER - NSDCCPCAREPLAN_GEN_ALL_CORE_FT
PRINCIPAL DISCHARGE DIAGNOSIS  Diagnosis: Amaurosis fugax of right eye  Assessment and Plan of Treatment: Resolved. Dilated eye exam normal  Outpatient follow-up at Ophthalomology clinic in one week      SECONDARY DISCHARGE DIAGNOSES  Diagnosis: S/P MVR (mitral valve repair)  Assessment and Plan of Treatment: Continue and remain compliant with Coumadin  Follow-up with Dr. Raza in 2-3 days for INR check PRINCIPAL DISCHARGE DIAGNOSIS  Diagnosis: Amaurosis fugax of right eye  Assessment and Plan of Treatment: Resolved. Dilated eye exam normal  Outpatient follow-up at Ophthalomology clinic in one week      SECONDARY DISCHARGE DIAGNOSES  Diagnosis: Breast mass, right  Assessment and Plan of Treatment: Plan to follow-up with Dr. Mac (Surg Onc) outpatient in 2 weeks    Diagnosis: Transient ischemic attack (TIA)  Assessment and Plan of Treatment: You were worked-up for a TIA (mini-stroke). Continue your Coumadin as prescribed and follow-up with Dr. Raza outpatient    Diagnosis: S/P MVR (mitral valve repair)  Assessment and Plan of Treatment: Continue and remain compliant with Coumadin  Follow-up with Dr. Raza in 2-3 days for INR check

## 2019-05-09 NOTE — PROVIDER CONTACT NOTE (CRITICAL VALUE NOTIFICATION) - ACTION/TREATMENT ORDERED:
Follow Heparin Nomogram protocol, rate adjusted, halted drip for 60 minutes. Will continue to monitor.

## 2019-05-09 NOTE — CONSULT NOTE ADULT - ASSESSMENT
53y old  Male who presents with a chief complaint of r sided visual loss/palpitation, with noted Right breast mass. 53y old  Male who presents with a chief complaint of r sided visual loss/palpitation, with noted Right breast mass since 11/2018    - Schedule elective excision of R breast mass outpatient, or will try to schedule as inpatient if pt okay to stay in hospital

## 2019-05-09 NOTE — PROVIDER CONTACT NOTE (CRITICAL VALUE NOTIFICATION) - ASSESSMENT
Pt AOx4, no s/s of bleeding noted, no dizziness, headache, lightheadedness noted, no bruising or hematoma noted.

## 2019-05-09 NOTE — DISCHARGE NOTE PROVIDER - HOSPITAL COURSE
52 yo PMHx of mitral valve replacement on Coumadin, non-smoker, presents to the ED referred by his cardiologist c/o 3 episodes of transient vision loss in right eye over last few months. He describes it as "opaque blurring of lower half of his vision" that lasts under a minute and resolves with an episode occurring day of arrival to the ED. 52 yo PMHx of mitral valve replacement on Coumadin, non-smoker, presents to the ED referred by his cardiologist c/o 3 episodes of transient vision loss in right eye over last few months. He describes it as "opaque blurring of lower half of his vision" that lasts under a minute and resolves with an episode occurring day of arrival to the ED. During hospital course, pt was seen by Ophthalmology, dilated fundus exam within normal limits in both eyes, low suspicion for GCA based on symptoms given normal inflammatory markers. CTH & Carotid ultrasound unremarkable, MRA showed...        -follow up at [facility] ([facility]) within 1 week of discharge. 54 yo PMHx of mitral valve replacement on Coumadin, non-smoker, presents to the ED referred by his cardiologist c/o 3 episodes of transient vision loss in right eye over last few months. He describes it as "opaque blurring of lower half of his vision" that lasts under a minute and resolves with an episode occurring day of arrival to the ED. During hospital course, pt was seen by Ophthalmology, dilated fundus exam within normal limits in both eyes, low suspicion for GCA based on symptoms given normal inflammatory markers. CTH & Carotid ultrasound unremarkable, MRA showed...        -follow up at [facility] ([facility]) within 1 week of discharge. Also follow up with neurology Dr. Baron in 2 weeks.

## 2019-05-09 NOTE — DISCHARGE NOTE PROVIDER - NSFOLLOWUPCLINICS_GEN_ALL_ED_FT
[facility]  Ophthalmology  [address] [place] [address] [address]  [place] [address]  Phone: [phone]  Fax:   Follow Up Time: 7-10 Days

## 2019-05-09 NOTE — PATIENT PROFILE ADULT - DOES PATIENT HAVE ADVANCE DIRECTIVE
I will START or STAY ON the medications listed below when I get home from the hospital:    aspirin 81 mg oral tablet  -- 1 tab(s) by mouth once a day  -- Indication: For Need for prophylactic measure    tamsulosin 0.4 mg oral capsule  -- 1 cap(s) by mouth once a day (at bedtime)  -- Indication: For BPH    metoprolol succinate 25 mg oral tablet, extended release  -- 1 tab(s) by mouth once a day  -- Indication: For HTN (hypertension)    simethicone 80 mg oral tablet, chewable  -- 1 tab(s) by mouth 3 times a day, As needed, Gas  -- Indication: For GAS    Multiple Vitamins oral tablet  -- 1 tab(s) by mouth once a day  -- Indication: For SUPPLEMENT    ascorbic acid 500 mg oral tablet  -- 1 tab(s) by mouth 2 times a day  -- Indication: For SUPPLEMENT    folic acid 1 mg oral tablet  -- 1 tab(s) by mouth once a day  -- Indication: For SUPPLEMENT
no

## 2019-05-09 NOTE — DISCHARGE NOTE PROVIDER - PROVIDER TOKENS
PROVIDER:[TOKEN:[[id number]],FOLLOWUP:[1-3 days]] PROVIDER:[TOKEN:[[id number]],FOLLOWUP:[1-3 days]],PROVIDER:[TOKEN:[[id number]],FOLLOWUP:[2 weeks]] PROVIDER:[TOKEN:[[id number]],FOLLOWUP:[1-3 days]],PROVIDER:[TOKEN:[[id number]],FOLLOWUP:[2 weeks]],PROVIDER:[TOKEN:[[id number]],FOLLOWUP:[2 weeks]]

## 2019-05-09 NOTE — CONSULT NOTE ADULT - SUBJECTIVE AND OBJECTIVE BOX
HPI:  CHIEF COMPLAINT:Patient is a 53y old  Male who presents with a chief complaint of r sided visual loss/palpitation    HPI:  pt is a 54 yo male with hx of mitral regurgitation s/p mechanical valve, non compliant to follow up   who was sent to er with decreased   r eye vision last few days, hd and palpitation.  vision , good  now  pt with known  hx of paf .  no chest pain.    PAST MEDICAL & SURGICAL HISTORY:  Mitral Valve Disease  Abnormality, Eye: ptygerium removed  s/p Mitral Valve Repair: 8/21/09      MEDICATIONS  (STANDING):    MEDICATIONS  (PRN):  coumadin    FAMILY HISTORY:      SOCIAL HISTORY:    [ ] Non-smoker  [ ] Smoker  [ ] Alcohol    Allergies    No Known Allergies    Intolerances    	    REVIEW OF SYSTEMS:  CONSTITUTIONAL: No fever, weight loss, or fatigue  EYES: No eye pain,+ r eye  visual disturbances, or discharge  ENT:  No difficulty hearing, tinnitus, vertigo; No sinus or throat pain  NECK: No pain or stiffness  RESPIRATORY: No cough, wheezing, chills or hemoptysis; + Shortness of Breath  CARDIOVASCULAR: No chest pain, palpitations, passing out, dizziness, or leg swelling  GASTROINTESTINAL: No abdominal or epigastric pain. No nausea, vomiting, or hematemesis; No diarrhea or constipation. No melena or hematochezia.  GENITOURINARY: No dysuria, frequency, hematuria, or incontinence  NEUROLOGICAL: No headaches, memory loss, loss of strength, numbness, or tremors  SKIN: No itching, burning, rashes, or lesions   LYMPH Nodes: No enlarged glands  ENDOCRINE: No heat or cold intolerance; No hair loss  MUSCULOSKELETAL: No joint pain or swelling; No muscle, back, or extremity pain  PSYCHIATRIC: No depression, anxiety, mood swings, or difficulty sleeping  HEME/LYMPH: No easy bruising, or bleeding gums  ALLERGY AND IMMUNOLOGIC: No hives or eczema	    [ ] All others negative	  [ ] Unable to obtain    PHYSICAL EXAM:  T(C): 37 (05-08-19 @ 16:40), Max: 37 (05-08-19 @ 16:40)  HR: 84 (05-08-19 @ 16:40) (84 - 84)  BP: 131/82 (05-08-19 @ 16:40) (131/82 - 131/82)  RR: 17 (05-08-19 @ 16:40) (17 - 17)  SpO2: 99% (05-08-19 @ 16:40) (99% - 99%)  Wt(kg): --  I&O's Summary      Appearance: Normal	  HEENT:   Normal oral mucosa, PERRL, EOMI	  Lymphatic: No lymphadenopathy  Cardiovascular: Normal S1 S2, No JVD, + murmurs, No edema  Respiratory: Lungs clear to auscultation	  Psychiatry: A & O x 3, Mood & affect appropriate  Gastrointestinal:  Soft, Non-tender, + BS	  Skin: No rashes, No ecchymoses, No cyanosis	  Neurologic: Non-focal  Extremities: Normal range of motion, No clubbing, cyanosis or edema  Vascular: Peripheral pulses palpable 2+ bilaterally  + small mass under the r breast, non tender.  lipoma,  right chest  wall  TELEMETRY: 	    ECG:  	  RADIOLOGY:  OTHER: 	  	  LABS:	 	    CARDIAC MARKERS:                              14.4   5.6   )-----------( 202      ( 08 May 2019 18:03 )             41.1           proBNP:   Lipid Profile:   HgA1c:   TSH:   PT/INR - ( 08 May 2019 18:03 )   PT: 19.0 sec;   INR: 1.63 ratio         PTT - ( 08 May 2019 18:03 )  PTT:33.7 sec    PREVIOUS DIAGNOSTIC TESTING:    < from: Transthoracic Echocardiogram w/ Doppler (05.13.10 @ 08:49) >  1. Mechanical mitral valve prosthesis.  2. Severe left atrial enlargement.  3. Normal left ventricular internal dimensions and wall  thickness.  4. Normal left ventricular systolic function. EF 60%. No  Segmental wall motion abnormalities  5. Normal right atrium.  6. Normal right ventricular size and systolic function.  7. Normal pericardium with no pericardial effusion.  8. Mild mitral regurgitation. Mean transmitral valve  gradient equals 5mm Hg, which is probably normal in the  setting of a prosthetic valve.  9. Mild tricuspid regurgitation. Estimated pulmonary artery  systolic pressure equals 31 mm Hg, assuming right atrial  pressure equals 10  mm Hg.    < end of copied text > (08 May 2019 20:22)      REVIEW OF SYSTEMS:  GEN: no fever,    no chills  RESP: no SOB,   no cough  CVS: no chest pain,   no palpitations  GI: no abdominal pain,   no nausea,   no vomiting,   no constipation,   no diarrhea  : no dysuria,   no frequency  NEURO: no headache,   no dizziness  PSYCH: no depression,   not anxious  Derm : no rash    Allergies    No Known Allergies    Intolerances        CAPILLARY BLOOD GLUCOSE        I&O's Summary      Vital Signs Last 24 Hrs  T(C): 36.4 (09 May 2019 05:16), Max: 37 (08 May 2019 16:40)  T(F): 97.6 (09 May 2019 05:16), Max: 98.6 (08 May 2019 16:40)  HR: 64 (09 May 2019 05:16) (64 - 84)  BP: 131/76 (09 May 2019 05:16) (131/76 - 138/86)  BP(mean): --  RR: 18 (09 May 2019 05:16) (17 - 18)  SpO2: 99% (09 May 2019 05:16) (99% - 99%)  PHYSICAL EXAM:  GENERAL: NAD,   HEAD:  Atraumatic, Normocephalic  EYES: EOMI,  NECK: Supple, No JVD  CHEST/LUNG: Clear to auscultation bilaterally; No wheeze  HEART: Regular rate and rhythm;        murmur  ABDOMEN: Soft, Nontender,   EXTREMITIES:         edema  NEUROLOGY:  alert    MEDICATIONS  (STANDING):  aspirin enteric coated 81 milliGRAM(s) Oral daily  heparin  Infusion.  Unit(s)/Hr (12 mL/Hr) IV Continuous <Continuous>  metoprolol succinate ER 25 milliGRAM(s) Oral daily    MEDICATIONS  (PRN):    LABS:                        14.9   5.1   )-----------( 187      ( 09 May 2019 04:30 )             42.9     05-09    142  |  104  |  8   ----------------------------<  103<H>  3.5   |  27  |  1.01    Ca    9.0      09 May 2019 04:30    TPro  6.4  /  Alb  3.9  /  TBili  1.6<H>  /  DBili  x   /  AST  22  /  ALT  24  /  AlkPhos  47  05-09    PT/INR - ( 09 May 2019 04:30 )   PT: 23.4 sec;   INR: 1.99 ratio         PTT - ( 09 May 2019 04:30 )  PTT:125.3 sec                        Consultant(s) Notes Reviewed:      Care Discussed with Consultants/Other Providers:  Plan:

## 2019-05-10 LAB
APTT BLD: 52.6 SEC — HIGH (ref 27.5–36.3)
CHOLEST SERPL-MCNC: 209 MG/DL — HIGH (ref 10–199)
HBA1C BLD-MCNC: 5.1 % — SIGNIFICANT CHANGE UP (ref 4–5.6)
HCT VFR BLD CALC: 43.2 % — SIGNIFICANT CHANGE UP (ref 39–50)
HDLC SERPL-MCNC: 72 MG/DL — SIGNIFICANT CHANGE UP
HGB BLD-MCNC: 15.2 G/DL — SIGNIFICANT CHANGE UP (ref 13–17)
INR BLD: 2.37 RATIO — HIGH (ref 0.88–1.16)
LIPID PNL WITH DIRECT LDL SERPL: 125 MG/DL — HIGH
MCHC RBC-ENTMCNC: 31.4 PG — SIGNIFICANT CHANGE UP (ref 27–34)
MCHC RBC-ENTMCNC: 35.1 GM/DL — SIGNIFICANT CHANGE UP (ref 32–36)
MCV RBC AUTO: 89.3 FL — SIGNIFICANT CHANGE UP (ref 80–100)
PLATELET # BLD AUTO: 194 K/UL — SIGNIFICANT CHANGE UP (ref 150–400)
PROTHROM AB SERPL-ACNC: 27.8 SEC — HIGH (ref 10–12.9)
RBC # BLD: 4.84 M/UL — SIGNIFICANT CHANGE UP (ref 4.2–5.8)
RBC # FLD: 11.5 % — SIGNIFICANT CHANGE UP (ref 10.3–14.5)
TOTAL CHOLESTEROL/HDL RATIO MEASUREMENT: 2.9 RATIO — LOW (ref 3.4–9.6)
TRIGL SERPL-MCNC: 61 MG/DL — SIGNIFICANT CHANGE UP (ref 10–149)
WBC # BLD: 5.1 K/UL — SIGNIFICANT CHANGE UP (ref 3.8–10.5)
WBC # FLD AUTO: 5.1 K/UL — SIGNIFICANT CHANGE UP (ref 3.8–10.5)

## 2019-05-10 RX ORDER — LISINOPRIL 2.5 MG/1
2.5 TABLET ORAL DAILY
Refills: 0 | Status: DISCONTINUED | OUTPATIENT
Start: 2019-05-10 | End: 2019-05-10

## 2019-05-10 RX ORDER — WARFARIN SODIUM 2.5 MG/1
6 TABLET ORAL ONCE
Refills: 0 | Status: COMPLETED | OUTPATIENT
Start: 2019-05-10 | End: 2019-05-10

## 2019-05-10 RX ORDER — LISINOPRIL 2.5 MG/1
2.5 TABLET ORAL DAILY
Refills: 0 | Status: DISCONTINUED | OUTPATIENT
Start: 2019-05-10 | End: 2019-05-11

## 2019-05-10 RX ADMIN — HEPARIN SODIUM 600 UNIT(S)/HR: 5000 INJECTION INTRAVENOUS; SUBCUTANEOUS at 07:40

## 2019-05-10 RX ADMIN — WARFARIN SODIUM 6 MILLIGRAM(S): 2.5 TABLET ORAL at 21:00

## 2019-05-10 RX ADMIN — Medication 25 MILLIGRAM(S): at 05:32

## 2019-05-10 RX ADMIN — Medication 81 MILLIGRAM(S): at 13:02

## 2019-05-10 NOTE — PROGRESS NOTE ADULT - ASSESSMENT
pt with hx of mechanical MVR  for  mR,    PAF non compliant to AC with visual complaints   and palpitation.      vision good  now  ct scan  of head , negative   lipoma,  right chest  wall  beta blocker  coumadin. per inr  asa daily   ct head, normal  pt  ha s no symptoms/  doing well pt with hx of mechanical MVR  for  mR,    PAF non compliant to AC with visual complaints   and palpitation.      vision good  now  ct scan  of head , negative   lipoma,  right chest  wall  beta blocker  coumadin. per inr  asa daily   ct head, normal<     from: Transthoracic Echocardiogram (05.09.19 @ 06:41) >  onclusions:  1. Mechanical prosthetic mitral valve replacement. Peak  mitral valve gradient equals 7 mm Hg, mean transmitral  valve gradient equals 4 mm Hg, which is probably normal in  the setting of a mechanical prosthetic mitral valve  replacement.  2. Normal left ventricular internal dimensions and wall  thicknesses.  3. Moderate global left ventricular systolic dysfunction.  4. Right ventricular enlargement with decreased right  < end of copied text >    pt  ha s no symptoms/  doing well  echo noted. low  EF, .  which is  new  w/p per card

## 2019-05-10 NOTE — PROGRESS NOTE ADULT - SUBJECTIVE AND OBJECTIVE BOX
CARDIOLOGY     PROGRESS  NOTE   ________________________________________________    CHIEF COMPLAINT:Patient is a 53y old  Male who presents with a chief complaint of blurry vision r side/palpitation (10 May 2019 08:05)    	  REVIEW OF SYSTEMS:  CONSTITUTIONAL: No fever, weight loss, or fatigue  EYES: No eye pain, visual disturbances, or discharge  ENT:  No difficulty hearing, tinnitus, vertigo; No sinus or throat pain  NECK: No pain or stiffness  RESPIRATORY: No cough, wheezing, chills or hemoptysis; No Shortness of Breath  CARDIOVASCULAR: No chest pain, palpitations, passing out, dizziness, or leg swelling  GASTROINTESTINAL: No abdominal or epigastric pain. No nausea, vomiting, or hematemesis; No diarrhea or constipation. No melena or hematochezia.  GENITOURINARY: No dysuria, frequency, hematuria, or incontinence  NEUROLOGICAL: No headaches, memory loss, loss of strength, numbness, or tremors  SKIN: No itching, burning, rashes, or lesions   LYMPH Nodes: No enlarged glands  ENDOCRINE: No heat or cold intolerance; No hair loss  MUSCULOSKELETAL: No joint pain or swelling; No muscle, back, or extremity pain  PSYCHIATRIC: No depression, anxiety, mood swings, or difficulty sleeping  HEME/LYMPH: No easy bruising, or bleeding gums  ALLERGY AND IMMUNOLOGIC: No hives or eczema	    [ ] All others negative	  [ ] Unable to obtain    PHYSICAL EXAM:  T(C): 36.9 (05-10-19 @ 05:30), Max: 36.9 (05-10-19 @ 05:30)  HR: 68 (05-10-19 @ 05:30) (67 - 75)  BP: 110/72 (05-10-19 @ 05:30) (108/74 - 131/81)  RR: 18 (05-10-19 @ 05:30) (16 - 18)  SpO2: 98% (05-10-19 @ 05:30) (97% - 100%)  Wt(kg): --  I&O's Summary      Appearance: Normal	  HEENT:   Normal oral mucosa, PERRL, EOMI	  Lymphatic: No lymphadenopathy  Cardiovascular: Normal S1 S2, No JVD, No murmurs, No edema  Respiratory: Lungs clear to auscultation	  Psychiatry: A & O x 3, Mood & affect appropriate  Gastrointestinal:  Soft, Non-tender, + BS	  Skin: No rashes, No ecchymoses, No cyanosis	  Neurologic: Non-focal  Extremities: Normal range of motion, No clubbing, cyanosis or edema  Vascular: Peripheral pulses palpable 2+ bilaterally    MEDICATIONS  (STANDING):  aspirin enteric coated 81 milliGRAM(s) Oral daily  heparin  Infusion.  Unit(s)/Hr (12 mL/Hr) IV Continuous <Continuous>  metoprolol succinate ER 25 milliGRAM(s) Oral daily      TELEMETRY: 	    ECG:  	  RADIOLOGY:  OTHER: 	  	  LABS:	 	    CARDIAC MARKERS:                                15.2   5.1   )-----------( 194      ( 10 May 2019 06:32 )             43.2     05-09    142  |  104  |  8   ----------------------------<  103<H>  3.5   |  27  |  1.01    Ca    9.0      09 May 2019 04:30    TPro  6.4  /  Alb  3.9  /  TBili  1.6<H>  /  DBili  x   /  AST  22  /  ALT  24  /  AlkPhos  47  05-09    proBNP:   Lipid Profile:   HgA1c:   TSH: Thyroid Stimulating Hormone, Serum: 1.89 uIU/mL (05-09 @ 09:14)    PT/INR - ( 10 May 2019 06:33 )   PT: 27.8 sec;   INR: 2.37 ratio         PTT - ( 10 May 2019 06:33 )  PTT:52.6 sec      Assessment and plan  ---------------------------  awaiting mri  continue ac  ?dc tomorrow when inr 3.0  asa daily

## 2019-05-10 NOTE — PROGRESS NOTE ADULT - SUBJECTIVE AND OBJECTIVE BOX
no cp/sob  REVIEW OF SYSTEMS:  GEN: no fever,    no chills  RESP: no SOB,   no cough  CVS: no chest pain,   no palpitations  GI: no abdominal pain,   no nausea,   no vomiting,   no constipation,   no diarrhea  : no dysuria,   no frequency  NEURO: no headache,   no dizziness  PSYCH: no depression,   not anxious  Derm : no rash    MEDICATIONS  (STANDING):  aspirin enteric coated 81 milliGRAM(s) Oral daily  heparin  Infusion.  Unit(s)/Hr (12 mL/Hr) IV Continuous <Continuous>  metoprolol succinate ER 25 milliGRAM(s) Oral daily    MEDICATIONS  (PRN):      Vital Signs Last 24 Hrs  T(C): 36.9 (10 May 2019 05:30), Max: 36.9 (10 May 2019 05:30)  T(F): 98.4 (10 May 2019 05:30), Max: 98.4 (10 May 2019 05:30)  HR: 68 (10 May 2019 05:30) (67 - 75)  BP: 110/72 (10 May 2019 05:30) (108/74 - 131/81)  BP(mean): --  RR: 18 (10 May 2019 05:30) (16 - 18)  SpO2: 98% (10 May 2019 05:30) (97% - 100%)  CAPILLARY BLOOD GLUCOSE        I&O's Summary      PHYSICAL EXAM:  HEAD:  Atraumatic, Normocephalic  NECK: Supple, No   JVD  CHEST/LUNG:   no     rales,     no,    rhonchi  HEART: Regular rate and rhythm;         murmur  ABDOMEN: Soft, Nontender, ;   EXTREMITIES:   no     edema  NEUROLOGY:  alert    LABS:                        15.2   5.1   )-----------( 194      ( 10 May 2019 06:32 )             43.2     05-09    142  |  104  |  8   ----------------------------<  103<H>  3.5   |  27  |  1.01    Ca    9.0      09 May 2019 04:30    TPro  6.4  /  Alb  3.9  /  TBili  1.6<H>  /  DBili  x   /  AST  22  /  ALT  24  /  AlkPhos  47  05-09    PT/INR - ( 10 May 2019 06:33 )   PT: 27.8 sec;   INR: 2.37 ratio         PTT - ( 10 May 2019 06:33 )  PTT:52.6 sec                Thyroid Stimulating Hormone, Serum: 1.89 uIU/mL (05-09 @ 09:14)          Consultant(s) Notes Reviewed:      Care Discussed with Consultants/Other Providers:

## 2019-05-11 ENCOUNTER — TRANSCRIPTION ENCOUNTER (OUTPATIENT)
Age: 54
End: 2019-05-11

## 2019-05-11 VITALS
DIASTOLIC BLOOD PRESSURE: 72 MMHG | HEART RATE: 70 BPM | RESPIRATION RATE: 18 BRPM | TEMPERATURE: 98 F | SYSTOLIC BLOOD PRESSURE: 114 MMHG | OXYGEN SATURATION: 98 %

## 2019-05-11 LAB
APTT BLD: 56.9 SEC — HIGH (ref 27.5–36.3)
HCT VFR BLD CALC: 42.2 % — SIGNIFICANT CHANGE UP (ref 39–50)
HGB BLD-MCNC: 15.2 G/DL — SIGNIFICANT CHANGE UP (ref 13–17)
INR BLD: 2.85 RATIO — HIGH (ref 0.88–1.16)
MCHC RBC-ENTMCNC: 32.2 PG — SIGNIFICANT CHANGE UP (ref 27–34)
MCHC RBC-ENTMCNC: 36.1 GM/DL — HIGH (ref 32–36)
MCV RBC AUTO: 89.3 FL — SIGNIFICANT CHANGE UP (ref 80–100)
PLATELET # BLD AUTO: 180 K/UL — SIGNIFICANT CHANGE UP (ref 150–400)
PROTHROM AB SERPL-ACNC: 33.9 SEC — HIGH (ref 10–12.9)
RBC # BLD: 4.72 M/UL — SIGNIFICANT CHANGE UP (ref 4.2–5.8)
RBC # FLD: 11.4 % — SIGNIFICANT CHANGE UP (ref 10.3–14.5)
WBC # BLD: 5.8 K/UL — SIGNIFICANT CHANGE UP (ref 3.8–10.5)
WBC # FLD AUTO: 5.8 K/UL — SIGNIFICANT CHANGE UP (ref 3.8–10.5)

## 2019-05-11 PROCEDURE — 70450 CT HEAD/BRAIN W/O DYE: CPT

## 2019-05-11 PROCEDURE — 71045 X-RAY EXAM CHEST 1 VIEW: CPT

## 2019-05-11 PROCEDURE — 85027 COMPLETE CBC AUTOMATED: CPT

## 2019-05-11 PROCEDURE — 99285 EMERGENCY DEPT VISIT HI MDM: CPT

## 2019-05-11 PROCEDURE — 86140 C-REACTIVE PROTEIN: CPT

## 2019-05-11 PROCEDURE — 83036 HEMOGLOBIN GLYCOSYLATED A1C: CPT

## 2019-05-11 PROCEDURE — 84443 ASSAY THYROID STIM HORMONE: CPT

## 2019-05-11 PROCEDURE — 93306 TTE W/DOPPLER COMPLETE: CPT

## 2019-05-11 PROCEDURE — 93880 EXTRACRANIAL BILAT STUDY: CPT

## 2019-05-11 PROCEDURE — 85610 PROTHROMBIN TIME: CPT

## 2019-05-11 PROCEDURE — 85652 RBC SED RATE AUTOMATED: CPT

## 2019-05-11 PROCEDURE — 71250 CT THORAX DX C-: CPT

## 2019-05-11 PROCEDURE — 70551 MRI BRAIN STEM W/O DYE: CPT

## 2019-05-11 PROCEDURE — 80061 LIPID PANEL: CPT

## 2019-05-11 PROCEDURE — 85730 THROMBOPLASTIN TIME PARTIAL: CPT

## 2019-05-11 PROCEDURE — 80053 COMPREHEN METABOLIC PANEL: CPT

## 2019-05-11 PROCEDURE — 93005 ELECTROCARDIOGRAM TRACING: CPT

## 2019-05-11 PROCEDURE — 86803 HEPATITIS C AB TEST: CPT

## 2019-05-11 RX ORDER — WARFARIN SODIUM 2.5 MG/1
6 TABLET ORAL
Qty: 180 | Refills: 0
Start: 2019-05-11 | End: 2019-06-09

## 2019-05-11 RX ORDER — ASPIRIN/CALCIUM CARB/MAGNESIUM 324 MG
1 TABLET ORAL
Qty: 30 | Refills: 0
Start: 2019-05-11 | End: 2019-06-09

## 2019-05-11 RX ORDER — METOPROLOL TARTRATE 50 MG
1 TABLET ORAL
Qty: 30 | Refills: 0
Start: 2019-05-11 | End: 2019-06-09

## 2019-05-11 RX ORDER — LISINOPRIL 2.5 MG/1
1 TABLET ORAL
Qty: 30 | Refills: 0
Start: 2019-05-11 | End: 2019-06-09

## 2019-05-11 RX ADMIN — Medication 81 MILLIGRAM(S): at 11:35

## 2019-05-11 RX ADMIN — LISINOPRIL 2.5 MILLIGRAM(S): 2.5 TABLET ORAL at 05:13

## 2019-05-11 RX ADMIN — HEPARIN SODIUM 400 UNIT(S)/HR: 5000 INJECTION INTRAVENOUS; SUBCUTANEOUS at 06:15

## 2019-05-11 RX ADMIN — Medication 25 MILLIGRAM(S): at 05:13

## 2019-05-11 NOTE — CONSULT NOTE ADULT - ASSESSMENT
Patient is a 53M with a history of mechanical mitral valve, HTN, paroxysmal atrial fibrillation on Coumadin who presented to the hospital initially due to sudden onset loss of vision in the R eye that has subsequently returned and has not recurred. His INR on admission was 1.63, so this was likely due to a TIA secondary to subtherapeutic anticoagulation in the setting of a mechanical valve/afib.     MRI brain showed no acute infarcts but showed minimal white matter ischemic changes. Bilateral carotid duplex shows no significant stenosis. LDL is 125. His most recent INR in the hospital is now in the therapeutic range at 2.85.     Recommendations:  Continue Coumadin for stroke prevention from afib/mechanical heart valve  Continue aspirin 81 mg daily in the setting of mechanical valve  Lipitor 80 mg qhs  Patient was counseled extensively on the importance of medication compliance and good blood pressure control as outpatient and following up with his PCP/cardiology outpatient to have INR followed   He should follow-up outpatient with neurology (can make an appointment to see Dr. Baron from vascular neurology at [phone]) Patient is a 53M with a history of mechanical mitral valve, HTN, paroxysmal atrial fibrillation on Coumadin who presented to the hospital initially due to sudden onset loss of vision in the R eye that has subsequently returned and has not recurred. His INR on admission was 1.63, so this was likely due to a TIA secondary to subtherapeutic anticoagulation in the setting of a mechanical valve/afib. Neurological exam is unremarkable. He no longer has any visual field deficits.     MRI brain showed no acute infarcts but showed minimal white matter ischemic changes. Bilateral carotid duplex shows no significant stenosis. LDL is 125. His most recent INR in the hospital is now in the therapeutic range at 2.85.     Recommendations:  Continue Coumadin for stroke prevention from afib/mechanical heart valve  Continue aspirin 81 mg daily in the setting of mechanical valve  Lipitor 80 mg qhs  Patient was counseled extensively on the importance of medication compliance and good blood pressure control as outpatient and following up with his PCP/cardiology outpatient to have INR followed   He should follow-up outpatient with neurology (can make an appointment to see Dr. Baron from vascular neurology at [phone])

## 2019-05-11 NOTE — PROGRESS NOTE ADULT - SUBJECTIVE AND OBJECTIVE BOX
no cp/sob    REVIEW OF SYSTEMS:  GEN: no fever,    no chills  RESP: no SOB,   no cough  CVS: no chest pain,   no palpitations  GI: no abdominal pain,   no nausea,   no vomiting,   no constipation,   no diarrhea  : no dysuria,   no frequency  NEURO: no headache,   no dizziness  PSYCH: no depression,   not anxious  Derm : no rash    MEDICATIONS  (STANDING):  aspirin enteric coated 81 milliGRAM(s) Oral daily  heparin  Infusion.  Unit(s)/Hr (12 mL/Hr) IV Continuous <Continuous>  lisinopril 2.5 milliGRAM(s) Oral daily  metoprolol succinate ER 25 milliGRAM(s) Oral daily    MEDICATIONS  (PRN):      Vital Signs Last 24 Hrs  T(C): 36.6 (11 May 2019 05:00), Max: 37.1 (10 May 2019 21:21)  T(F): 97.9 (11 May 2019 05:00), Max: 98.8 (10 May 2019 21:21)  HR: 66 (11 May 2019 05:00) (65 - 66)  BP: 114/78 (11 May 2019 05:00) (113/73 - 120/89)  BP(mean): --  RR: 18 (11 May 2019 05:00) (18 - 18)  SpO2: 100% (11 May 2019 05:00) (99% - 100%)  CAPILLARY BLOOD GLUCOSE        I&O's Summary    10 May 2019 07:01  -  11 May 2019 07:00  --------------------------------------------------------  IN: 240 mL / OUT: 0 mL / NET: 240 mL        PHYSICAL EXAM:  HEAD:  Atraumatic, Normocephalic  NECK: Supple, No   JVD  CHEST/LUNG:   no     rales,     no,    rhonchi  HEART: Regular rate and rhythm;         murmur  ABDOMEN: Soft, Nontender, ;   EXTREMITIES:   no     edema  NEUROLOGY:  alert    LABS:                        15.2   5.8   )-----------( 180      ( 11 May 2019 05:22 )             42.2           PT/INR - ( 11 May 2019 05:22 )   PT: 33.9 sec;   INR: 2.85 ratio         PTT - ( 11 May 2019 05:22 )  PTT:56.9 sec              Hemoglobin A1C, Whole Blood: 5.1 % (05-10 @ 08:15)    Thyroid Stimulating Hormone, Serum: 1.89 uIU/mL (05-09 @ 09:14)          Consultant(s) Notes Reviewed:      Care Discussed with Consultants/Other Providers:

## 2019-05-11 NOTE — PROGRESS NOTE ADULT - REASON FOR ADMISSION
blurry vision r side/palpitation

## 2019-05-11 NOTE — PROGRESS NOTE ADULT - ASSESSMENT
pt with hx of mechanical MVR  for  mR,    PAF non compliant to AC with visual complaints   and palpitation.      vision good  now  ct scan  of head , negative   lipoma,  right chest  wall  beta blocker  coumadin. per inr  asa daily   ct head, normal  mri a s noted        < from: MR Head No Cont (05.09.19 @ 23:24) >  IMPRESSION:  There are a few minimal white matter [id number], likely minimal   microvascular disease or migraine sequela. Interval punctate foci of   susceptibility on SWI, possibly prior microhemorrhage, calcification, or   tiny cavernomas, increased in size and number from prior, suggest   follow-up to ensure stability  < end of copied text >               from: Transthoracic Echocardiogram (05.09.19 @ 06:41) >  onclusions:  1. Mechanical prosthetic mitral valve replacement. Peak  mitral valve gradient equals 7 mm Hg, mean transmitral  valve gradient equals 4 mm Hg, which is probably normal in  the setting of a mechanical prosthetic mitral valve  replacement.  2. Normal left ventricular internal dimensions and wall  thicknesses.  3. Moderate global left ventricular systolic dysfunction.  4. Right ventricular enlargement with decreased right  < end of copied text >    pt  ha s no symptoms/  doing well  echo noted. low  EF, .  which is  new  w/p per card

## 2019-05-11 NOTE — DISCHARGE NOTE NURSING/CASE MANAGEMENT/SOCIAL WORK - NSDCDPATPORTLINK_GEN_ALL_CORE
You can access the [facility], offered by [facility], by registering with the following website: [email or web address]

## 2019-05-11 NOTE — PROGRESS NOTE ADULT - SUBJECTIVE AND OBJECTIVE BOX
CARDIOLOGY     PROGRESS  NOTE   ________________________________________________    CHIEF COMPLAINT:Patient is a 53y old  Male who presents with a chief complaint of blurry vision r side/palpitation (11 May 2019 08:21)    	  REVIEW OF SYSTEMS:  CONSTITUTIONAL: No fever, weight loss, or fatigue  EYES: No eye pain, visual disturbances, or discharge  ENT:  No difficulty hearing, tinnitus, vertigo; No sinus or throat pain  NECK: No pain or stiffness  RESPIRATORY: No cough, wheezing, chills or hemoptysis; No Shortness of Breath  CARDIOVASCULAR: No chest pain, palpitations, passing out, dizziness, or leg swelling  GASTROINTESTINAL: No abdominal or epigastric pain. No nausea, vomiting, or hematemesis; No diarrhea or constipation. No melena or hematochezia.  GENITOURINARY: No dysuria, frequency, hematuria, or incontinence  NEUROLOGICAL: No headaches, memory loss, loss of strength, numbness, or tremors  SKIN: No itching, burning, rashes, or lesions   LYMPH Nodes: No enlarged glands  ENDOCRINE: No heat or cold intolerance; No hair loss  MUSCULOSKELETAL: No joint pain or swelling; No muscle, back, or extremity pain  PSYCHIATRIC: No depression, anxiety, mood swings, or difficulty sleeping  HEME/LYMPH: No easy bruising, or bleeding gums  ALLERGY AND IMMUNOLOGIC: No hives or eczema	    [ ] All others negative	  [ ] Unable to obtain    PHYSICAL EXAM:  T(C): 36.6 (05-11-19 @ 05:00), Max: 37.1 (05-10-19 @ 21:21)  HR: 66 (05-11-19 @ 05:00) (65 - 66)  BP: 114/78 (05-11-19 @ 05:00) (113/73 - 120/89)  RR: 18 (05-11-19 @ 05:00) (18 - 18)  SpO2: 100% (05-11-19 @ 05:00) (99% - 100%)  Wt(kg): --  I&O's Summary    10 May 2019 07:01  -  11 May 2019 07:00  --------------------------------------------------------  IN: 240 mL / OUT: 0 mL / NET: 240 mL    11 May 2019 07:01  -  11 May 2019 08:36  --------------------------------------------------------  IN: 0 mL / OUT: 550 mL / NET: -550 mL        Appearance: Normal	  HEENT:   Normal oral mucosa, PERRL, EOMI	  Lymphatic: No lymphadenopathy  Cardiovascular: Normal S1 S2, No JVD, No murmurs, No edema  Respiratory: Lungs clear to auscultation	  Psychiatry: A & O x 3, Mood & affect appropriate  Gastrointestinal:  Soft, Non-tender, + BS	  Skin: No rashes, No ecchymoses, No cyanosis	  Neurologic: Non-focal  Extremities: Normal range of motion, No clubbing, cyanosis or edema  Vascular: Peripheral pulses palpable 2+ bilaterally    MEDICATIONS  (STANDING):  aspirin enteric coated 81 milliGRAM(s) Oral daily  heparin  Infusion.  Unit(s)/Hr (12 mL/Hr) IV Continuous <Continuous>  lisinopril 2.5 milliGRAM(s) Oral daily  metoprolol succinate ER 25 milliGRAM(s) Oral daily      TELEMETRY: 	    ECG:  	  RADIOLOGY:  OTHER: 	  	  LABS:	 	    CARDIAC MARKERS:                                15.2   5.8   )-----------( 180      ( 11 May 2019 05:22 )             42.2   < from: MR Head No Cont (05.09.19 @ 23:24) >  There are a few minimal white matter [id number], likely minimal   microvascular disease or migraine sequela. Interval punctate foci of   susceptibility on SWI, possibly prior microhemorrhage, calcification, or   tiny cavernomas, increased in size and number from prior, suggest   follow-up to ensure stability.    < end of copied text >          proBNP:   Lipid Profile: Cholesterol 209    HDL 72  TG 61    HgA1c: Hemoglobin A1C, Whole Blood: 5.1 % (05-10 @ 08:15)    TSH: Thyroid Stimulating Hormone, Serum: 1.89 uIU/mL (05-09 @ 09:14)    PT/INR - ( 11 May 2019 05:22 )   PT: 33.9 sec;   INR: 2.85 ratio         PTT - ( 11 May 2019 05:22 )  PTT:56.9 sec      Assessment and plan  ---------------------------  doing well  MR noted   ? micro emboli to the brain sec to low inr (pt non compliant)  neuro eval so pt to understand importance of taking and comply with meds  surgery appreciated  continue lisinopril and metoprolol er  f/u in with me in 1 week

## 2019-05-11 NOTE — CONSULT NOTE ADULT - SUBJECTIVE AND OBJECTIVE BOX
MORRIS [id number] is a 53y old  Male who presents with a chief complaint of blurry vision r side/palpitation (11 May 2019 08:36)    HPI:    Patient is a 53M with a history of mechanical mitral valve, HTN, paroxysmal atrial fibrillation on Coumadin who presented to the hospital initially due to change in vision. Patient reports that he had sudden onset loss of vision in the inferior half of his visual field in the right eye. This lasted about a minute and then his vision came back and has been normal since then. His INR on admission was 1.63 and he reports that he is not sure when he last had it checked before that. He denies history of stroke or TIA in the past. He denies smoking history. He feels back to baseline and denies any headaches, recurrent changes in vision, paresthesias, focal weakness, difficulty speaking, difficulty walking or any other acute concerns.                           14.4   5.6   )-----------( 202      ( 08 May 2019 18:03 )             41.1               MEDICATIONS  (STANDING):  aspirin enteric coated 81 milliGRAM(s) Oral daily  lisinopril 2.5 milliGRAM(s) Oral daily  metoprolol succinate ER 25 milliGRAM(s) Oral daily    MEDICATIONS  (PRN):    PAST MEDICAL & SURGICAL HISTORY:  Mitral Valve Disease  Abnormality, Eye: ptygerium removed  s/p Mitral Valve Repair: 8/21/09    FAMILY HISTORY:    Allergies    No Known Allergies    Intolerances    SHx - No smoking, No ETOH, No drug abuse      Review of Systems:  CONSTITUTIONAL:   HEENT:  No visual loss, blurred vision, double vision.  No hearing loss, sneezing, congestion, runny nose or sore throat.  SKIN:  No rash or itching.  CARDIOVASCULAR:  No chest pain, chest pressure or chest discomfort. No palpitations or edema.  RESPIRATORY:  No shortness of breath, cough or sputum.  GASTROINTESTINAL:  No anorexia, nausea, vomiting or diarrhea. No abdominal pain.  GENITOURINARY:  No dysuria. No increased frequency. No retention. No incontinence.  NEUROLOGICAL:  See HPI  MUSCULOSKELETAL:  No muscle, back pain, joint pain or stiffness.  HEMATOLOGIC:  No anemia, bleeding or bruising.  ENDOCRINOLOGIC:  No reports of sweating, cold or heat intolerance. No polyuria or polydipsia.      Vital Signs Last 24 Hrs  T(C): 36.6 (11 May 2019 05:00), Max: 37.1 (10 May 2019 21:21)  T(F): 97.9 (11 May 2019 05:00), Max: 98.8 (10 May 2019 21:21)  HR: 66 (11 May 2019 05:00) (65 - 66)  BP: 114/78 (11 May 2019 05:00) (113/73 - 120/89)  BP(mean): --  RR: 18 (11 May 2019 05:00) (18 - 18)  SpO2: 100% (11 May 2019 05:00) (99% - 100%)    General Exam:   General appearance: No acute distress               Neurological Exam:  Mental Status: Orientated to self, date and place.  Attention intact.  No dysarthria. Speech fluent.  Cranial Nerves:   PERRL, EOMI, VFF, no nystagmus.    CN V1-3 intact to light touch .  No facial asymmetry.    Motor:   Tone: normal.                  Strength:     [] Upper extremity                      Delt       Bicep    Tricep                                                  R         5/5        5/5        5/5       5/5                                               L          5/5        5/5        5/5       5/5  [] Lower extremity                       HF          KE          KF        DF         PF                                               R        5/5        5/5        5/5       5/5       5/5                                               L         5/5        5/5       5/5       5/5        5/5  Pronator drift: none                 Dysmetria: None to finger-nose-finger    Sensation: intact to light touch      Gait: normal.                                15.2   5.8   )-----------( 180      ( 11 May 2019 05:22 )             42.2       Radiology    < from: MR Head No Cont (05.09.19 @ 23:24) >    IMPRESSION:    There are a few minimal white matter [id number], likely minimal   microvascular disease or migraine sequela. Interval punctate foci of   susceptibility on SWI, possibly prior microhemorrhage, calcification, or   tiny cavernomas, increased in size and number from prior, suggest   follow-up to ensure stability.    < end of copied text >    < from: [facility] Duplex Carotid, Bilat (05.09.19 @ 12:39) >  Impression: No evidence of significant stenosis of either carotid artery.   No significant interval changes seen.    < end of copied text >

## 2019-05-11 NOTE — DISCHARGE NOTE NURSING/CASE MANAGEMENT/SOCIAL WORK - NSDCPEPTSTRK_GEN_ALL_CORE
Need for follow up after discharge/Prescribed medications/Stroke support groups for patients, families, and friends/Risk factors for stroke/Stroke education booklet/Call 911 for stroke/Stroke warning signs and symptoms/Signs and symptoms of stroke

## 2019-05-28 ENCOUNTER — APPOINTMENT (OUTPATIENT)
Dept: SURGICAL ONCOLOGY | Facility: CLINIC | Age: 54
End: 2019-05-28
Payer: COMMERCIAL

## 2019-05-28 VITALS
OXYGEN SATURATION: 100 % | SYSTOLIC BLOOD PRESSURE: 122 MMHG | WEIGHT: 195 LBS | TEMPERATURE: 97.7 F | HEIGHT: 72 IN | BODY MASS INDEX: 26.41 KG/M2 | HEART RATE: 67 BPM | DIASTOLIC BLOOD PRESSURE: 67 MMHG | RESPIRATION RATE: 16 BRPM

## 2019-05-28 DIAGNOSIS — Z86.79 PERSONAL HISTORY OF OTHER DISEASES OF THE CIRCULATORY SYSTEM: ICD-10-CM

## 2019-05-28 DIAGNOSIS — D17.1 BENIGN LIPOMATOUS NEOPLASM OF SKIN AND SUBCUTANEOUS TISSUE OF TRUNK: ICD-10-CM

## 2019-05-28 DIAGNOSIS — Z86.69 PERSONAL HISTORY OF OTHER DISEASES OF THE NERVOUS SYSTEM AND SENSE ORGANS: ICD-10-CM

## 2019-05-28 DIAGNOSIS — Z78.9 OTHER SPECIFIED HEALTH STATUS: ICD-10-CM

## 2019-05-28 DIAGNOSIS — Z87.898 PERSONAL HISTORY OF OTHER SPECIFIED CONDITIONS: ICD-10-CM

## 2019-05-28 PROCEDURE — 99214 OFFICE O/P EST MOD 30 MIN: CPT

## 2019-05-28 RX ORDER — METOPROLOL TARTRATE 25 MG/1
25 TABLET, FILM COATED ORAL
Refills: 0 | Status: ACTIVE | COMMUNITY

## 2019-05-28 RX ORDER — WARFARIN 6 MG/1
6 TABLET ORAL
Refills: 0 | Status: ACTIVE | COMMUNITY

## 2019-05-28 RX ORDER — ASPIRIN 81 MG
81 TABLET, DELAYED RELEASE (ENTERIC COATED) ORAL
Refills: 0 | Status: ACTIVE | COMMUNITY

## 2019-05-28 RX ORDER — LISINOPRIL 30 MG/1
TABLET ORAL
Refills: 0 | Status: ACTIVE | COMMUNITY

## 2019-05-29 NOTE — CONSULT LETTER
[Dear  ___] : Dear  [unfilled], [Please see my note below.] : Please see my note below. [Consult Letter:] : I had the pleasure of evaluating your patient, [unfilled]. [Sincerely,] : Sincerely, [Consult Closing:] : Thank you very much for allowing me to participate in the care of this patient.  If you have any questions, please do not hesitate to contact me. [( Thank you for referring [unfilled] for consultation for _____ )] : Thank you for referring [unfilled] for consultation for [unfilled] [FreeTextEntry2] :  Dr. Ramiro Raza [FreeTextEntry3] : Osmin Mac MD, FICS, FACS\par , Surgical Oncology\par [facility]\par [address]\[place], [place]- [address]\par \par [address]\[place], [place]- [address]\par (mob) [phone]\par (o) [phone]\par (f) [phone]

## 2019-05-29 NOTE — PHYSICAL EXAM
[Normal] : supple, no neck mass and thyroid not enlarged [Normal Neck Lymph Nodes] : normal neck lymph nodes  [Normal Supraclavicular Lymph Nodes] : normal supraclavicular lymph nodes [Normal Groin Lymph Nodes] : normal groin lymph nodes [Normal Axillary Lymph Nodes] : normal axillary lymph nodes [Normal] : oriented to person, place and time, with appropriate affect [de-identified] : right chest wall- lateral and inferior to the nipple- 2 cm mbile soft mass- likely lipoma

## 2019-05-29 NOTE — ASSESSMENT
[FreeTextEntry1] : Impression:\par Palpable lump most consistent with a lipoma\par \par \par Plan:\par I have discussed the diagnosis, therapeutic plan and options with the patient at length. I have discussed the operative therapy of an excisional biopsy however at this time he expressed the preference to continue to observe the area of concern. He will call my office if symptoms change including discomfort or continued growth.

## 2019-05-29 NOTE — HISTORY OF PRESENT ILLNESS
[de-identified] : Mr. Armendariz is a 53 year old male who presents today for an initial consultation.  He  was referred by Dr. Ramiro Raza.\par \par Today he presents with a complaint of a Right chest lump present for approximately 5 months.  He underwent a CT chest on 5/8/19 and was not noted with any masses.\par \par PMH significant for HTN and MV replacement 10/2010 (on coumadin).\par Denies family history of cancers.\par \par Internist: Dr. MOR Brunner [phone]

## 2020-03-21 NOTE — ED ADULT NURSE NOTE - NS ED NURSE REPORT GIVEN DT
08-May-2019 23:34 Patient assigned to me by night hospitalist in charge for management and care for patient for this evening only. Care to be resumed by day hospitalist in the morning and thereafter.   Kirti Sow MD p [phone]

## 2021-03-20 NOTE — ED ADULT TRIAGE NOTE - NS ED NURSE BANDS TYPE
Please Return to the emergency room if your symptoms worsen, change or you become concerned.   Please take your hypertension medications as prescribed.   Please follow up with your primary care doctor in 1 week.    Name band;

## 2022-04-13 ENCOUNTER — APPOINTMENT (OUTPATIENT)
Dept: ORTHOPEDIC SURGERY | Facility: CLINIC | Age: 57
End: 2022-04-13

## 2023-10-03 NOTE — PATIENT PROFILE ADULT - NSPROPTRIGHTNOTIFY_GEN_A_NUR
declines Klisyri Counseling:  I discussed with the patient the risks of Klisyri including but not limited to erythema, scaling, itching, weeping, crusting, and pain.